# Patient Record
Sex: MALE | Race: WHITE | Employment: STUDENT | URBAN - METROPOLITAN AREA
[De-identification: names, ages, dates, MRNs, and addresses within clinical notes are randomized per-mention and may not be internally consistent; named-entity substitution may affect disease eponyms.]

---
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
## 2025-04-11 ENCOUNTER — HOSPITAL ENCOUNTER (EMERGENCY)
Age: 20
Discharge: HOME | End: 2025-04-11
Payer: COMMERCIAL

## 2025-04-11 VITALS
TEMPERATURE: 98 F | OXYGEN SATURATION: 100 % | WEIGHT: 225 LBS | HEIGHT: 72 IN | BODY MASS INDEX: 30.48 KG/M2 | RESPIRATION RATE: 14 BRPM | SYSTOLIC BLOOD PRESSURE: 142 MMHG | HEART RATE: 81 BPM | DIASTOLIC BLOOD PRESSURE: 97 MMHG

## 2025-04-11 DIAGNOSIS — S61.211A LACERATION OF LEFT INDEX FINGER W/O FOREIGN BODY W/O DAMAGE TO NAIL, INITIAL ENCOUNTER: Primary | ICD-10-CM

## 2025-04-11 PROCEDURE — 12002 RPR S/N/AX/GEN/TRNK2.6-7.5CM: CPT

## 2025-04-11 PROCEDURE — 99283 EMERGENCY DEPT VISIT LOW MDM: CPT

## 2025-04-11 NOTE — DISCHARGE INSTRUCTIONS
Keep wound clean and dry.   Do not get stitches wet for the first 24 hours.   After this wash with soap and water twice a day.   Apply triple antibiotic ointment twice a day for the 3 days.   Then leave open to air as the oxygen will help it to heal.   Take Tylenol and/or ibuprofen as needed for pain.   Have the stitches removed in 9-10 days.   Watch for any increased redness, swelling, or drainage.   Follow up with your PCP in 3-5 days.     Thank you for choosing [facility] for your care.

## 2025-04-11 NOTE — ED PROVIDER NOTES
Patient Seen in: [facility] In [place]    History     Chief Complaint   Patient presents with    Laceration/Abrasion     Stated Complaint: left 2nd digit lac    Subjective:   18 yo male presents to the emergency department with c/o finger injury.  Patient states he was up in an attic and was moving around pieces of plywood.  There was a metal joist and he thought he had cleared it, but when he went to move the plywood sheet he cut his finger on the metal.  He has a laceration to his left index finger.  Patient's tetanus is up to date.  He denies any numbness or tingling.  Patient is otherwise healthy.      The history is provided by the patient.         Objective:     History reviewed. No pertinent past medical history.           History reviewed. No pertinent surgical history.             Social History     Socioeconomic History    Marital status: Single   Tobacco Use    Smoking status: Never    Smokeless tobacco: Never   Vaping Use    Vaping status: Never Used   Substance and Sexual Activity    Alcohol use: Never    Drug use: Never                  Physical Exam     ED Triage Vitals [04/11/25 1102]   BP (!) 142/97   Pulse 81   Resp 14   Temp 98 °F (36.7 °C)   Temp src    SpO2 100 %   O2 Device None (Room air)       Current Vitals:   Vital Signs  BP: (!) 142/97  Pulse: 81  Resp: 14  Temp: 98 °F (36.7 °C)    Oxygen Therapy  SpO2: 100 %  O2 Device: None (Room air)        Physical Exam  Vitals and nursing note reviewed.   Constitutional:       General: He is not in acute distress.     Appearance: Normal appearance. He is normal weight. He is not ill-appearing.   HENT:      Head: Normocephalic and atraumatic.      Nose: Nose normal.      Mouth/Throat:      Mouth: Mucous membranes are moist.      Pharynx: Oropharynx is clear.   Eyes:      Conjunctiva/sclera: Conjunctivae normal.      Pupils: Pupils are equal, round, and reactive to light.   Cardiovascular:      Rate and Rhythm: Normal rate and  regular rhythm.      Pulses: Normal pulses.      Heart sounds: Normal heart sounds.   Pulmonary:      Effort: Pulmonary effort is normal. No respiratory distress.      Breath sounds: Normal breath sounds.   Musculoskeletal:         General: Signs of injury present. Normal range of motion.   Skin:     General: Skin is warm and dry.      Capillary Refill: Capillary refill takes less than 2 seconds.      Comments: 3 cm linear laceration of the left dorsal index finger just distal to the PIP joint.  ROM, motor strength and sensation intact.  Cap refill <2 sec.     Neurological:      General: No focal deficit present.      Mental Status: He is alert and oriented to person, place, and time.   Psychiatric:         Mood and Affect: Mood normal.         Behavior: Behavior normal.           ED Course   Labs Reviewed - No data to display                     MDM        Medical Decision Making  18 yo male with laceration of the left 2nd digit.  Tetanus is up to date.  Verbal consent received.     Laceration was anesthetized with lidocaine locally.  The wound was cleansed and irrigated copiously.  There was no visible foreign body within the wound. The wound was closed using 6 simple interrupted sutures with 4-0 Prolene.  The quality of the closure was excellent.  Patient tolerated well.     Discussed with patient wound care at home.  Recommend keeping wound covered and wearing gloves while at work until sutures are removed.  Patient can have sutures removed in the next 7 to 10 days.  No evidence of sepsis or cellulitis.    Risk  OTC drugs.        Disposition and Plan     Clinical Impression:  1. Laceration of left index finger w/o foreign body w/o damage to nail, initial encounter         Disposition:  Discharge  4/11/2025 11:37 am    Follow-up:  Ivan Frye MD  [address] [facility]  [address] [place] [id number]  [phone]    Follow up in 1 week(s)            Medications Prescribed:  Discharge Medication List as of  4/11/2025 11:49 AM          Supplementary Documentation:

## 2025-07-03 ENCOUNTER — APPOINTMENT (OUTPATIENT)
Dept: GENERAL RADIOLOGY | Facility: HOSPITAL | Age: 20
End: 2025-07-03
Attending: EMERGENCY MEDICINE
Payer: COMMERCIAL

## 2025-07-03 ENCOUNTER — HOSPITAL ENCOUNTER (EMERGENCY)
Facility: HOSPITAL | Age: 20
Discharge: HOME | End: 2025-07-03
Attending: EMERGENCY MEDICINE
Payer: COMMERCIAL

## 2025-07-03 VITALS
BODY MASS INDEX: 29.95 KG/M2 | RESPIRATION RATE: 20 BRPM | OXYGEN SATURATION: 100 % | TEMPERATURE: 99 F | SYSTOLIC BLOOD PRESSURE: 129 MMHG | HEART RATE: 86 BPM | WEIGHT: 221.13 LBS | DIASTOLIC BLOOD PRESSURE: 62 MMHG | HEIGHT: 72 IN

## 2025-07-03 DIAGNOSIS — S13.9XXA NECK SPRAIN, INITIAL ENCOUNTER: ICD-10-CM
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
## 2025-07-03 PROCEDURE — 99284 EMERGENCY DEPT VISIT MOD MDM: CPT

## 2025-07-03 PROCEDURE — 72040 X-RAY EXAM NECK SPINE 2-3 VW: CPT | Performed by: EMERGENCY MEDICINE

## 2025-07-03 PROCEDURE — 72100 X-RAY EXAM L-S SPINE 2/3 VWS: CPT | Performed by: EMERGENCY MEDICINE

## 2025-07-03 PROCEDURE — 72072 X-RAY EXAM THORAC SPINE 3VWS: CPT | Performed by: EMERGENCY MEDICINE

## 2025-07-03 RX ORDER — IBUPROFEN 800 MG/1
800 TABLET, FILM COATED ORAL ONCE
Status: COMPLETED | OUTPATIENT
Start: 2025-07-03 | End: 2025-07-03

## 2025-07-03 NOTE — DISCHARGE INSTRUCTIONS
Ibuprofen 800 mg every 6 hours as needed for pain control.    May use topical heat.    Return for any worsening pain or any concerns.
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
